# Patient Record
Sex: FEMALE | Race: ASIAN | URBAN - METROPOLITAN AREA
[De-identification: names, ages, dates, MRNs, and addresses within clinical notes are randomized per-mention and may not be internally consistent; named-entity substitution may affect disease eponyms.]

---
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
## 2024-07-31 ENCOUNTER — PATIENT MESSAGE (OUTPATIENT)
Dept: FAMILY MEDICINE CLINIC | Facility: CLINIC | Age: 19
End: 2024-07-31

## 2024-07-31 NOTE — TELEPHONE ENCOUNTER
From: Cher Gandhi  To: Enedina Interiano  Sent: 7/31/2024 1:48 PM CDT  Subject: Tetanus Vaccination    Hello Dr. Interiano,    I'm Cher Gandhi and I am just emailing to inquire about what would be the best way to get my tetanus vaccination? My school requires students to have their tetanus shot within the past 10 years and it has been 10 years since my last tetanus vaccination so I just want to know what would be the fastest way to get it done. I'm aware I can schedule an appointment with you however your appointment slots are usually fully booked for the next few months and I am trying get the vaccination so that I can graduate this fall semester. Your response would be greatly appreciated.    Thank you,  Cher Gandhi

## 2024-08-02 ENCOUNTER — TELEPHONE (OUTPATIENT)
Dept: FAMILY MEDICINE CLINIC | Facility: CLINIC | Age: 19
End: 2024-08-02

## 2024-08-02 ENCOUNTER — OFFICE VISIT (OUTPATIENT)
Dept: FAMILY MEDICINE CLINIC | Facility: CLINIC | Age: 19
End: 2024-08-02

## 2024-08-02 VITALS
WEIGHT: 103 LBS | HEART RATE: 61 BPM | DIASTOLIC BLOOD PRESSURE: 60 MMHG | HEIGHT: 60 IN | SYSTOLIC BLOOD PRESSURE: 90 MMHG | BODY MASS INDEX: 20.22 KG/M2

## 2024-08-02 DIAGNOSIS — Z23 ENCOUNTER FOR ADMINISTRATION OF VACCINE: ICD-10-CM

## 2024-08-02 DIAGNOSIS — Z00.00 WELL ADULT EXAM: Primary | ICD-10-CM

## 2024-08-02 PROCEDURE — 90471 IMMUNIZATION ADMIN: CPT | Performed by: NURSE PRACTITIONER

## 2024-08-02 PROCEDURE — 99385 PREV VISIT NEW AGE 18-39: CPT | Performed by: NURSE PRACTITIONER

## 2024-08-02 PROCEDURE — 90651 9VHPV VACCINE 2/3 DOSE IM: CPT | Performed by: NURSE PRACTITIONER

## 2024-08-02 PROCEDURE — 90472 IMMUNIZATION ADMIN EACH ADD: CPT | Performed by: NURSE PRACTITIONER

## 2024-08-02 PROCEDURE — 90715 TDAP VACCINE 7 YRS/> IM: CPT | Performed by: NURSE PRACTITIONER

## 2024-08-02 NOTE — TELEPHONE ENCOUNTER
Patient called us back was given a appointment with Ani Cruz for today.    Future Appointments   Date Time Provider Department Center   8/2/2024  3:20 PM Ani Cruz APRN [facility]

## 2024-08-02 NOTE — PROGRESS NOTES
HPI    Patient presents for annual physical.  Negative for past medical history.    Gyne history - G0.    Last pap - never has been sexually active.    LMP - 7/727/2024.    Diet - diet is healthy.  Exercise - exercises regularly.    Immunizations - tdap and gardasil today.      Review of Systems   All other systems reviewed and are negative.       Vitals:    08/02/24 1518   BP: 103/70   Pulse: 84   Weight: 103 lb (46.7 kg)   Height: 5' (1.524 m)     Body mass index is 20.12 kg/m².    Health Maintenance   Topic Date Due    Annual Physical  Never done    HPV Vaccines (2 - 3-dose series) 01/08/2020    COVID-19 Vaccine (2 - 2023-24 season) 09/01/2023    Annual Depression Screening  Never done    Pap Smear  Never done    DTaP,Tdap,and Td Vaccines (7 - Td or Tdap) 04/22/2024    Influenza Vaccine (1) 10/01/2024    Hepatitis B Vaccines  Completed    Hepatitis A Vaccines  Completed    MMR Vaccines  Completed    Varicella Vaccines  Completed    Meningococcal Vaccine  Completed    Pneumococcal Vaccine: Birth to 64yrs  Aged Out       No LMP recorded.    History reviewed. No pertinent past medical history.    .History reviewed. No pertinent surgical history.    Family History   Problem Relation Age of Onset    No Known Problems Father     No Known Problems Mother        Social History     Socioeconomic History    Marital status: Single     Spouse name: Not on file    Number of children: Not on file    Years of education: Not on file    Highest education level: Not on file   Occupational History    Not on file   Tobacco Use    Smoking status: Never    Smokeless tobacco: Never   Vaping Use    Vaping status: Never Used   Substance and Sexual Activity    Alcohol use: No    Drug use: No    Sexual activity: Not on file   Other Topics Concern    Not on file   Social History Narrative    Not on file     Social Determinants of Health     Financial Resource Strain: Not on file   Food Insecurity: Not on file   Transportation Needs: Not on  file   Physical Activity: Not on file   Stress: Not on file   Social Connections: Not on file   Housing Stability: Not on file       No current outpatient medications on file.       Allergies:  No Known Allergies    Physical Exam  Vitals and nursing note reviewed.   Constitutional:       General: She is not in acute distress.     Appearance: Normal appearance. She is well-developed. She is not ill-appearing, toxic-appearing or diaphoretic.   HENT:      Head: Normocephalic and atraumatic.      Right Ear: Hearing, tympanic membrane, ear canal and external ear normal. There is no impacted cerumen.      Left Ear: Hearing, tympanic membrane, ear canal and external ear normal. There is no impacted cerumen.      Nose: Nose normal. No congestion.      Mouth/Throat:      Mouth: Mucous membranes are dry.      Pharynx: Oropharynx is clear. No oropharyngeal exudate or posterior oropharyngeal erythema.   Eyes:      General:         Right eye: No discharge.         Left eye: No discharge.      Conjunctiva/sclera: Conjunctivae normal.   Neck:      Thyroid: No thyromegaly.   Cardiovascular:      Rate and Rhythm: Normal rate and regular rhythm.      Pulses: Normal pulses.      Heart sounds: Normal heart sounds. No murmur heard.  Pulmonary:      Effort: Pulmonary effort is normal. No respiratory distress.      Breath sounds: Normal breath sounds. No stridor. No wheezing, rhonchi or rales.   Chest:      Chest wall: No tenderness.   Abdominal:      General: Abdomen is flat. Bowel sounds are normal. There is no distension.      Palpations: Abdomen is soft. There is no mass.      Tenderness: There is no abdominal tenderness. There is no guarding or rebound.      Hernia: No hernia is present.   Musculoskeletal:         General: Normal range of motion.      Cervical back: Normal range of motion and neck supple.   Lymphadenopathy:      Cervical: No cervical adenopathy.   Skin:     General: Skin is warm and dry.   Neurological:      Mental  Status: She is alert and oriented to person, place, and time.   Psychiatric:         Mood and Affect: Mood normal.         Behavior: Behavior normal.         Thought Content: Thought content normal.         Judgment: Judgment normal.          Assessment and Plan:  Problem List Items Addressed This Visit    None  Visit Diagnoses       Well adult exam    -  Primary    Relevant Orders    TETANUS, DIPHTHERIA TOXOIDS AND ACELLULAR PERTUSIS VACCINE (TDAP), >7 YEARS, IM USE    GARDASIL 9    Lipid Panel    Comp Metabolic Panel (14)    CBC With Differential With Platelet    TSH W Reflex To Free T4    Encounter for administration of vaccine        Relevant Orders    TETANUS, DIPHTHERIA TOXOIDS AND ACELLULAR PERTUSIS VACCINE (TDAP), >7 YEARS, IM USE    GARDASIL 9           Follow up for fasting labs.    Patient with syncopal episode during vaccine administration.  Gardasil vaccine administered into right bicep.  Tdap vaccine administered into left bicep.  During Tdap administration, patient had a syncopal episode and started to fall forward.  At this time, MA helped patient to lay back into bed and I was called into the room.  Patient was laid back and put into Trendelenburg position.  Ice pack was placed onto patient's forehead.  Patient was given cold water and apple juice.  Vitals were checked every 5 minutes until patient was back to baseline and stable for discharge.  Refer to RL6.     Discussed plan of care with patient and patient is in agreement.  All questions answered. Patient to call with questions or concerns.    Encouraged to sign up for My Chart if not already registered.

## 2024-08-08 ENCOUNTER — LAB ENCOUNTER (OUTPATIENT)
Dept: LAB | Age: 19
End: 2024-08-08
Attending: NURSE PRACTITIONER
Payer: COMMERCIAL

## 2024-08-08 LAB
ALBUMIN SERPL-MCNC: 5 G/DL (ref 3.2–4.8)
ALBUMIN/GLOB SERPL: 1.3 {RATIO} (ref 1–2)
ALP LIVER SERPL-CCNC: 43 U/L
ALT SERPL-CCNC: 12 U/L
ANION GAP SERPL CALC-SCNC: 6 MMOL/L (ref 0–18)
AST SERPL-CCNC: 19 U/L (ref ?–34)
BASOPHILS # BLD AUTO: 0.03 X10(3) UL (ref 0–0.2)
BASOPHILS NFR BLD AUTO: 0.5 %
BILIRUB SERPL-MCNC: 1 MG/DL (ref 0.3–1.2)
BUN BLD-MCNC: 12 MG/DL (ref 9–23)
BUN/CREAT SERPL: 14.3 (ref 10–20)
CALCIUM BLD-MCNC: 10.1 MG/DL (ref 8.7–10.4)
CHLORIDE SERPL-SCNC: 105 MMOL/L (ref 98–112)
CHOLEST SERPL-MCNC: 210 MG/DL (ref ?–200)
CO2 SERPL-SCNC: 27 MMOL/L (ref 21–32)
CREAT BLD-MCNC: 0.84 MG/DL
DEPRECATED RDW RBC AUTO: 39.9 FL (ref 35.1–46.3)
EGFRCR SERPLBLD CKD-EPI 2021: 101 ML/MIN/1.73M2 (ref 60–?)
EOSINOPHIL # BLD AUTO: 0.17 X10(3) UL (ref 0–0.7)
EOSINOPHIL NFR BLD AUTO: 2.9 %
ERYTHROCYTE [DISTWIDTH] IN BLOOD BY AUTOMATED COUNT: 12.2 % (ref 11–15)
FASTING PATIENT LIPID ANSWER: YES
FASTING STATUS PATIENT QL REPORTED: YES
GLOBULIN PLAS-MCNC: 3.9 G/DL (ref 2–3.5)
GLUCOSE BLD-MCNC: 88 MG/DL (ref 70–99)
HCT VFR BLD AUTO: 44.3 %
HDLC SERPL-MCNC: 54 MG/DL (ref 40–59)
HGB BLD-MCNC: 15 G/DL
IMM GRANULOCYTES # BLD AUTO: 0.01 X10(3) UL (ref 0–1)
IMM GRANULOCYTES NFR BLD: 0.2 %
LDLC SERPL CALC-MCNC: 149 MG/DL (ref ?–100)
LYMPHOCYTES # BLD AUTO: 1.71 X10(3) UL (ref 1–4)
LYMPHOCYTES NFR BLD AUTO: 29.6 %
MCH RBC QN AUTO: 30.3 PG (ref 26–34)
MCHC RBC AUTO-ENTMCNC: 33.9 G/DL (ref 31–37)
MCV RBC AUTO: 89.5 FL
MONOCYTES # BLD AUTO: 0.36 X10(3) UL (ref 0.1–1)
MONOCYTES NFR BLD AUTO: 6.2 %
NEUTROPHILS # BLD AUTO: 3.49 X10 (3) UL (ref 1.5–7.7)
NEUTROPHILS # BLD AUTO: 3.49 X10(3) UL (ref 1.5–7.7)
NEUTROPHILS NFR BLD AUTO: 60.6 %
NONHDLC SERPL-MCNC: 156 MG/DL (ref ?–130)
OSMOLALITY SERPL CALC.SUM OF ELEC: 285 MOSM/KG (ref 275–295)
PLATELET # BLD AUTO: 280 10(3)UL (ref 150–450)
POTASSIUM SERPL-SCNC: 4 MMOL/L (ref 3.5–5.1)
PROT SERPL-MCNC: 8.9 G/DL (ref 5.7–8.2)
RBC # BLD AUTO: 4.95 X10(6)UL
SODIUM SERPL-SCNC: 138 MMOL/L (ref 136–145)
TRIGL SERPL-MCNC: 40 MG/DL (ref 30–149)
TSI SER-ACNC: 3.04 MIU/ML (ref 0.55–4.78)
VLDLC SERPL CALC-MCNC: 7 MG/DL (ref 0–30)
WBC # BLD AUTO: 5.8 X10(3) UL (ref 4–11)

## 2024-08-08 PROCEDURE — 84443 ASSAY THYROID STIM HORMONE: CPT | Performed by: NURSE PRACTITIONER

## 2024-08-08 PROCEDURE — 80061 LIPID PANEL: CPT | Performed by: NURSE PRACTITIONER

## 2024-08-08 PROCEDURE — 85025 COMPLETE CBC W/AUTO DIFF WBC: CPT | Performed by: NURSE PRACTITIONER

## 2024-08-08 PROCEDURE — 36415 COLL VENOUS BLD VENIPUNCTURE: CPT | Performed by: NURSE PRACTITIONER

## 2024-08-08 PROCEDURE — 80053 COMPREHEN METABOLIC PANEL: CPT | Performed by: NURSE PRACTITIONER

## (undated) NOTE — LETTER
VACCINE ADMINISTRATION RECORD  PARENT / GUARDIAN APPROVAL  Date: 2019  Vaccine administered to: Malinda Moritz     : 2005    MRN: [id number]    A copy of the appropriate Centers for Disease Control and Prevention Vaccine Information sta

## (undated) NOTE — MR AVS SNAPSHOT
[facility] [address]  [address], [address]  [address] [facility]  [phone]               Thank you for choosing us for your health care visit with Nurse.   We are glad to serve you and happy to provide you with this summary of your vis

## (undated) NOTE — LETTER
Name:  Andreas Bonifacio School Year:  11th Grade Class: Student ID No.:   [facility] Phone:  [phone] (home)  :  10year old   Name Relationship [address] Work Phone Home Phone Mobile Phone   [address] 13. Does anyone in your family have a heart problem, pacemaker, or implanted defibrillator? No   16. Has anyone in your family had unexplained fainting, seizures, or near drowning?  No   BONE AND JOINT QUESTIONS    17. Have you ever had an injury to a bone, 38. Have you ever had numbness, tingling, or weakness in your arms or legs after being hit or falling? No   39. Have you ever been unable to move your arms / legs after being hit /fall? No   40. Have you ever become ill while exercising in the heat?  No   41 Appearance:  Marfan stigmata (kyphoscoliosis, high-arched palate, pectus excavatum,      arachnodactyly, arm span > height, hyperlaxity, myopia, MVP, aortic insufficiency) Yes    Eyes/Ears/Nose/Throat:    · Pupils equal  · Hearing Yes    Lymph nodes Yes that I/our student will not use performance-enhancing substances as defined in the [facility] Performance-Enhancing Substance Testing Program Protocol.  We have reviewed the policy and understand that I/our student may be asked to submit to testing for the presen

## (undated) NOTE — Clinical Note
[facility] [employer] of Child Health Examination       Student's Name  Rafi De La Cruz (If adding dates to the above immunization history section, put your initials by date(s) and sign here.)   ALTERNATIVE PROOF OF IMMUNITY   1.Clinical diagnosis (measles, mumps, hepatits B) is allowed when verified by physician & supported with lab confirma Diagnosis of asthma? Child wakes during the night coughing  Yes       No   Yes       No    Loss of function of one of paired organs? (eye/ear/kidney/testicle)  Yes       No      Birth Defects? Developmental delay?   Yes       No   Yes       No  [facility] Family History                    Ethnic Minority                             Signs of Insulin Resistance (hypertension, dyslipidemia, polycystic ovarian syndrome, acanthosis nigricans)                           At Risk     Lead Risk Questionnaire  Req'd f NEEDS/MODIFICATIONS required in the school setting  None DIETARY Needs/Restrictions     None   SPECIAL INSTRUCTIONS/DEVICES e.g. safety glasses, glass eye, chest protector for arrhythmia, pacemaker, prosthetic device, dental bridge, false teeth, athleticsu

## (undated) NOTE — Clinical Note
[facility] [email or web address] of Child Health Examination       Student's Name  Rafi De La Cruz health care provider, school health professional, or health official.       Person signing below is verifying  parent/guardian’s description of varicella disease is indicative of past infection and is accepting such hx as documentation of disease.        Julienne Frost Head Injury/Concussion/Passed out? Yes       No  TB skin text positive (past/present)? Yes       No *If yes, refer to local    Seizures? What are they like? Yes       No  TB disease (past or present)?   Yes       No *health department   Heart problem/Sh Result:                 TB Skin OR Blood Test   Rec.only for children in high-risk groups incl.  children immunosuppressed due to HIV infection or other conditions, frequent travel to or born in high prevalence countries or those exposed to EMERGENCY ACTION  needed while at school due to child's health condition (e.g., seizures, asthma, insect sting, food, peanut allergy, bleeding problem, diabetes, heart problem)? No  If yes, please describe.      On the basis of the examination on this day,

## (undated) NOTE — Clinical Note
VACCINE ADMINISTRATION RECORD  PARENT / GUARDIAN APPROVAL  Date: 2017  Vaccine administered to: Linda Ruby     : 2005    MRN: [id number]    A copy of the appropriate Centers for Disease Control and Prevention Vaccine Information stat

## (undated) NOTE — LETTER
[facility] Examination       Student's Name  Neeta Lenz Title                           Date     Signature HEALTH HISTORY          TO BE COMPLETED AND SIGNED BY PARENT/GUARDIAN AND VERIFIED BY HEALTH CARE PROVIDER    ALLERGIES  (Food, drug, insect, other)  Patient has no known allergies.  MEDICATION  (List all prescribed or taken on a regular basis.)  No current /76   Pulse 77   Temp 98.1 °F (36.7 °C) (Oral)   Resp 20   Ht 5' (1.524 m)   Wt 110 lb 8 oz (50.1 kg)   LMP 11/14/2019 (Approximate)   BMI 21.58 kg/m²     DIABETES SCREENING  BMI>85% age/sex  No And any two of the following:  Family History No    Eth Respiratory Yes                   Diagnosis of Asthma: No Mental Health Yes        Currently Prescribed Asthma Medication:            Quick-relief  medication (e.g. Short Acting Beta Antagonist): No          Controller medication (e.g. inhaled corticostero

## (undated) NOTE — MR AVS SNAPSHOT
[address], [address]  [address] [facility]  [phone]               Thank you for choosing us for your health care visit with Argenis Arguelles MD.  We are glad to serve you and happy to provide you with this summary ?  Snack Wisely – Snacks are “mini meals” so make them healthy by eating fresh or dried fruit, veggie sticks with dip, whole grain crackers and peanut butter, smoothies, a bowl of cereal with milk, yogurt and pretzels, randee bread & hummus, low fat granola b o “Screen time” doesn’t count. Get up and move as much as possible! ? Balance your day  o Take a walk or ride your bike with a friend, play fetch with the dog, or dance in addition to organized activities. o Remember sleep is critical to good health.  If Your teen may still be experiencing some of the changes of puberty, such as:  Acne and body odor. Hormones that increase during puberty can cause acne (pimples) on the face and body. Hormones can also increase sweating and cause a stronger body odor.   Body chips—should be eaten rarely. Some teens fall into the trap of snacking on junk food and fast food throughout the day. Make sure the kitchen is stocked with healthy options for after-school snacks.  If your teen does choose to eat junk food, consider making Being active during the day will help your child sleep better at night. Discourage use of the TV, computer, or video games for at least an hour before your teen goes to bed.  (This is good advice for parents, too!)  Make a rule that cell phones must be tur cholesterol may be tested at this visit.  Based on recommendations from the CDC, at this visit your child may receive the following vaccinations:  Meningococcal  Influenza (flu), annually  Recognizing signs of depression  It’s normal for teenagers to have e Current Medications      Notice  As of 5/15/2017  4:22 PM    You have not been prescribed any medications. [employer]     Sign up for [employer] access for your child.   [employer] access allows you to view health information for your child from their rec o go on a walking scavenger hunt through the neighborhood   o grow a family garden    In addition to 11, 4, 3, 2, 1 families can make small changes in their family routines to help everyone lead healthier active lives.  Try:  o Eating breakfast everyday  o E